# Patient Record
Sex: MALE | Race: WHITE | NOT HISPANIC OR LATINO | Employment: FULL TIME | URBAN - METROPOLITAN AREA
[De-identification: names, ages, dates, MRNs, and addresses within clinical notes are randomized per-mention and may not be internally consistent; named-entity substitution may affect disease eponyms.]

---

## 2018-06-26 ENCOUNTER — HISTORICAL (OUTPATIENT)
Dept: ADMINISTRATIVE | Facility: HOSPITAL | Age: 43
End: 2018-06-26

## 2022-04-29 NOTE — OP NOTE
DATE OF SURGERY:   6-26-18     SURGEON:  Nora Guallpa MD    PREOPERATIVE DIAGNOSIS:  Nucleosclerotic cataract of the right eye.    POST OPERATIVE DIAGNOSIS:  Nucleosclerotic cataract of the right eye.    PROCEDURE:  Cataract extraction with intraocular lens implantation of the right eye.    ANESTHESIA:  Local plus MAC.    COMPLICATIONS:  None.    ESTIMATED BLOOD LOSS:  None.    After discussion of risks, benefits and alternative with the patient and family, informed consent was obtained by the patient in clinic including but not limited to bleeding, infection, decreased vision, loss of the eye, need for subsequent surgery.  The patient understands and wishes to proceed.    PROCEDURE IN DETAIL:    The patient was brought into the operating room and laid in supine manner.  After anesthesia was undertaken without complication, the right eye and periorbital region were prepped and draped in usual manner for intraocular surgery while a speculum was placed in the operative eye for adequate exposure. A paracentesis incision was made at approximately 11 o'clock with a clear cornea at the limbus. Preservative free Lidocaine and epinephrine was injected into the anterior chamber followed by viscoelastic.  A triplanar cataract wound was then created approximately 7 o'clock through clear cornea at the limbus.  Curvilinear capsulorrhexis was created without complication.     BSS sonic cannula was used to hydrodissect the lens.  The lens was found to move freely within the capsular bag.  Lens was then removed in divide and conquer technique.  Remaining cortical material was removed with I&A handpiece.  A 19.50 diopter ZCBOO lens was then implanted into the capsular bag. The remaining viscoelastic was then removed with the irrigation aspiration handpiece. The wounds were hydrated and postop injections given  A 10.0 nylon was palced at the cataract wound.  The patient tolerated the procedure well.  Eyelid speculum was  removed followed by pressure patch and shield.  The patient was turned over to anesthesia in stable condition.

## 2022-12-09 ENCOUNTER — HOSPITAL ENCOUNTER (EMERGENCY)
Facility: HOSPITAL | Age: 47
Discharge: HOME | End: 2022-12-09
Attending: STUDENT IN AN ORGANIZED HEALTH CARE EDUCATION/TRAINING PROGRAM
Payer: COMMERCIAL

## 2022-12-09 VITALS
SYSTOLIC BLOOD PRESSURE: 145 MMHG | HEART RATE: 88 BPM | TEMPERATURE: 98 F | HEIGHT: 69 IN | RESPIRATION RATE: 16 BRPM | DIASTOLIC BLOOD PRESSURE: 102 MMHG | BODY MASS INDEX: 24.09 KG/M2 | WEIGHT: 162.63 LBS | OXYGEN SATURATION: 98 %

## 2022-12-09 DIAGNOSIS — H66.42 SUPPURATIVE OTITIS MEDIA OF LEFT EAR, UNSPECIFIED CHRONICITY: ICD-10-CM

## 2022-12-09 DIAGNOSIS — R42 DIZZINESS: Primary | ICD-10-CM

## 2022-12-09 PROCEDURE — 25000003 PHARM REV CODE 250: Performed by: STUDENT IN AN ORGANIZED HEALTH CARE EDUCATION/TRAINING PROGRAM

## 2022-12-09 PROCEDURE — 99284 EMERGENCY DEPT VISIT MOD MDM: CPT

## 2022-12-09 RX ORDER — MECLIZINE HYDROCHLORIDE 50 MG/1
25 TABLET ORAL 3 TIMES DAILY PRN
Qty: 20 TABLET | Refills: 0 | Status: SHIPPED | OUTPATIENT
Start: 2022-12-09

## 2022-12-09 RX ORDER — AMOXICILLIN AND CLAVULANATE POTASSIUM 875; 125 MG/1; MG/1
1 TABLET, FILM COATED ORAL 2 TIMES DAILY
Qty: 14 TABLET | Refills: 0 | Status: SHIPPED | OUTPATIENT
Start: 2022-12-09

## 2022-12-09 RX ORDER — MECLIZINE HYDROCHLORIDE 25 MG/1
50 TABLET ORAL
Status: COMPLETED | OUTPATIENT
Start: 2022-12-09 | End: 2022-12-09

## 2022-12-09 RX ADMIN — MECLIZINE HYDROCHLORIDE 50 MG: 25 TABLET ORAL at 10:12

## 2022-12-09 NOTE — Clinical Note
"Jorge Perez" Je was seen and treated in our emergency department on 12/9/2022.  He may return to work on 12/13/2022.  Recommend that patient hold off on going off shore until symptoms improve or released by ear nose and throat doctor.     If you have any questions or concerns, please don't hesitate to call.      Bear Ibarra MD"

## 2022-12-09 NOTE — ED PROVIDER NOTES
Encounter Date: 12/9/2022       History     Chief Complaint   Patient presents with    Dizziness     Patient c/o blurred vision since this morning. Patient also complains of dizziness and nauseous. Patent also reports his ears are ringing x 3 weeks.   Patient reports he has seen an ENT     47-year-old male with history of glaucoma of left eye and surgery in left ear many years ago falling ENT presents with episode of bilateral blurry vision that started early this morning.  Patient also mentioned that he has ringing in his left ear that has been going on for the past 3 weeks.  Patient also endorses dizziness episodic and nausea during this time.  Denies any focal weakness, weight change, trauma, fever, changes in hearing, vomiting    Review of patient's allergies indicates:   Allergen Reactions    Codeine     Sulfa (sulfonamide antibiotics)      Past Medical History:   Diagnosis Date    Unspecified glaucoma     left eye     No past surgical history on file.  No family history on file.     Review of Systems   Constitutional: Negative.    HENT:  Positive for ear pain.    Eyes:  Positive for visual disturbance.   Respiratory: Negative.     Cardiovascular: Negative.    Gastrointestinal:  Positive for nausea.   Genitourinary: Negative.    Musculoskeletal: Negative.    Skin: Negative.    Neurological:  Positive for dizziness.   Psychiatric/Behavioral: Negative.     All other systems reviewed and are negative.    Physical Exam     Initial Vitals [12/09/22 0949]   BP Pulse Resp Temp SpO2   (!) 141/103 90 16 98 °F (36.7 °C) 98 %      MAP       --         Physical Exam    Nursing note and vitals reviewed.  Constitutional: Vital signs are normal. He appears well-developed and well-nourished.   HENT:   Head: Normocephalic and atraumatic.   Right Ear: Hearing, tympanic membrane and ear canal normal.   Left Ear: No mastoid tenderness. Tympanic membrane is bulging. A middle ear effusion is present.   Eyes: Conjunctivae and lids  are normal.   Neck: Trachea normal. Neck supple.   Cardiovascular:  Normal rate, regular rhythm, normal heart sounds and normal pulses.           Pulmonary/Chest: Breath sounds normal. He has no wheezes. He has no rhonchi.   Abdominal: Abdomen is soft. Bowel sounds are normal. He exhibits no distension. There is no abdominal tenderness. There is no rebound and no guarding.   Musculoskeletal:         General: Normal range of motion.      Cervical back: Neck supple.     Neurological: He is alert and oriented to person, place, and time. He has normal strength. No cranial nerve deficit or sensory deficit. He displays a negative Romberg sign. Coordination and gait normal. GCS eye subscore is 4. GCS verbal subscore is 5. GCS motor subscore is 6.   Cerebellar exam unremarkable   Skin: Skin is warm. Capillary refill takes less than 2 seconds.   Psychiatric: He has a normal mood and affect. His speech is normal. Thought content normal.       ED Course   Procedures  Labs Reviewed - No data to display       Imaging Results    None          Medications   meclizine tablet 50 mg (has no administration in time range)     Medical Decision Making:   Initial Assessment:   47-year-old male with history of glaucoma of left eye and surgery in left ear many years ago falling ENT presents with episode of bilateral blurry vision that started early this morning.  Afebrile vitals stable.  Physical noted.  Patient possibly has a ear infection left ear.  Will treat as possible ear infection.  Neurological exam unremarkable.  Patient feels better after meclizine.  Will discharge patient when antibiotics and meclizine.  Return precautions.  Follow-up with ENT                        Clinical Impression:   Final diagnoses:  [R42] Dizziness (Primary)  [H66.42] Suppurative otitis media of left ear, unspecified chronicity      ED Disposition Condition    Discharge Stable          ED Prescriptions       Medication Sig Dispense Start Date End Date  Auth. Provider    meclizine (ANTIVERT) 50 MG tablet Take 0.5 tablets (25 mg total) by mouth 3 (three) times daily as needed for Dizziness or Nausea. 20 tablet 12/9/2022 -- Bear Ibarra MD    amoxicillin-clavulanate 875-125mg (AUGMENTIN) 875-125 mg per tablet Take 1 tablet by mouth 2 (two) times daily. 14 tablet 12/9/2022 -- Bear Ibarra MD          Follow-up Information       Follow up With Specialties Details Why Contact Info    [facility]  In 2 days  [address] [facility] [address] [phone]               Bear Ibarra MD  12/09/22 8798